# Patient Record
Sex: FEMALE | URBAN - METROPOLITAN AREA
[De-identification: names, ages, dates, MRNs, and addresses within clinical notes are randomized per-mention and may not be internally consistent; named-entity substitution may affect disease eponyms.]

---

## 2019-02-20 ENCOUNTER — OFFICE VISIT (OUTPATIENT)
Dept: URBAN - METROPOLITAN AREA CLINIC 76 | Facility: CLINIC | Age: 64
End: 2019-02-20
Payer: COMMERCIAL

## 2019-02-20 DIAGNOSIS — H04.123 DRY EYE SYNDROME OF BILATERAL LACRIMAL GLANDS: ICD-10-CM

## 2019-02-20 DIAGNOSIS — H40.011 OPEN ANGLE WITH BORDERLINE FINDINGS, LOW RISK, RIGHT EYE: ICD-10-CM

## 2019-02-20 PROCEDURE — 92015 DETERMINE REFRACTIVE STATE: CPT | Performed by: OPTOMETRIST

## 2019-02-20 PROCEDURE — 92014 COMPRE OPH EXAM EST PT 1/>: CPT | Performed by: OPTOMETRIST

## 2019-02-20 ASSESSMENT — KERATOMETRY
OD: 42.63
OS: 43.00

## 2019-02-20 ASSESSMENT — INTRAOCULAR PRESSURE
OD: 18
OS: 15

## 2019-02-20 ASSESSMENT — VISUAL ACUITY
OD: 20/25
OS: 20/40

## 2019-02-20 NOTE — IMPRESSION/PLAN
Impression: Open angle with borderline findings, low risk, right eye: H40.011. OD. Plan: Discussed diagnosis in detail with patient. Recommend Glaucoma testing VF and OCT.

## 2019-03-20 ENCOUNTER — OFFICE VISIT (OUTPATIENT)
Dept: URBAN - METROPOLITAN AREA CLINIC 76 | Facility: CLINIC | Age: 64
End: 2019-03-20
Payer: COMMERCIAL

## 2019-03-20 DIAGNOSIS — H52.13 MYOPIA, BILATERAL: ICD-10-CM

## 2019-03-20 PROCEDURE — 92133 CPTRZD OPH DX IMG PST SGM ON: CPT | Performed by: OPTOMETRIST

## 2019-03-20 PROCEDURE — 92083 EXTENDED VISUAL FIELD XM: CPT | Performed by: OPTOMETRIST

## 2019-03-20 PROCEDURE — 99213 OFFICE O/P EST LOW 20 MIN: CPT | Performed by: OPTOMETRIST

## 2019-03-20 ASSESSMENT — INTRAOCULAR PRESSURE
OS: 14
OD: 16

## 2019-03-20 NOTE — IMPRESSION/PLAN
Impression: Open angle with borderline findings, low risk, bilateral: H40.013. OU. preformed OCT and VF today. Large cupping OU. Plan: Discussed diagnosis with patient. No need for treatment at this time. Continue to monitor. Yearly glaucoma testing, DE, VF, and OCT.

## 2019-03-20 NOTE — IMPRESSION/PLAN
Impression: Myopia, bilateral: H52.13. OU. Plan: Doctor redo. Error while inputting information. Right eye rx changed.

## 2020-08-05 ENCOUNTER — OFFICE VISIT (OUTPATIENT)
Dept: URBAN - METROPOLITAN AREA CLINIC 76 | Facility: CLINIC | Age: 65
End: 2020-08-05
Payer: COMMERCIAL

## 2020-08-05 DIAGNOSIS — H40.013 OPEN ANGLE WITH BORDERLINE FINDINGS, LOW RISK, BILATERAL: ICD-10-CM

## 2020-08-05 PROCEDURE — 92014 COMPRE OPH EXAM EST PT 1/>: CPT | Performed by: OPTOMETRIST

## 2020-08-05 ASSESSMENT — INTRAOCULAR PRESSURE
OS: 15
OD: 17

## 2020-08-05 ASSESSMENT — VISUAL ACUITY
OS: 20/30
OD: 20/30

## 2020-08-05 ASSESSMENT — KERATOMETRY
OS: 43.13
OD: 42.75

## 2020-08-05 NOTE — IMPRESSION/PLAN
Impression: Open angle with borderline findings, low risk, bilateral: H40.013. Bilateral. large cupping, needs updated testing. Plan: Discussed with patient. Continue to monitor without treatment. Recommend updated testing.

## 2020-09-04 ENCOUNTER — OFFICE VISIT (OUTPATIENT)
Dept: URBAN - METROPOLITAN AREA CLINIC 76 | Facility: CLINIC | Age: 65
End: 2020-09-04
Payer: COMMERCIAL

## 2020-09-04 PROCEDURE — 92083 EXTENDED VISUAL FIELD XM: CPT | Performed by: OPTOMETRIST

## 2020-09-04 PROCEDURE — 99213 OFFICE O/P EST LOW 20 MIN: CPT | Performed by: OPTOMETRIST

## 2020-09-04 PROCEDURE — 92133 CPTRZD OPH DX IMG PST SGM ON: CPT | Performed by: OPTOMETRIST

## 2020-09-04 ASSESSMENT — INTRAOCULAR PRESSURE
OD: 17
OS: 18

## 2020-09-04 NOTE — IMPRESSION/PLAN
Impression: Open angle with borderline findings, low risk, bilateral: H40.013. Bilateral. IOP stable. Preformed and reviewed VF and RNFL OCT. VF OD normal, OS possible early superior nasal step. RNFL OCT OD normal OS mild superior NFL thinning. possibly due to sleep apnea? Plan: Discussed with diagnosis with patient. Discussed treatment vs monitoring. Patient would like to continue to monitor without treatment. Recommend repeating testing at next visit. Advised patient that treatment may be necessary depending on the testing results at the next visit.

## 2021-03-04 ENCOUNTER — OFFICE VISIT (OUTPATIENT)
Dept: URBAN - METROPOLITAN AREA CLINIC 76 | Facility: CLINIC | Age: 66
End: 2021-03-04
Payer: COMMERCIAL

## 2021-03-04 PROCEDURE — 99213 OFFICE O/P EST LOW 20 MIN: CPT | Performed by: OPTOMETRIST

## 2021-03-04 PROCEDURE — 92133 CPTRZD OPH DX IMG PST SGM ON: CPT | Performed by: OPTOMETRIST

## 2021-03-04 PROCEDURE — 92083 EXTENDED VISUAL FIELD XM: CPT | Performed by: OPTOMETRIST

## 2021-03-04 ASSESSMENT — INTRAOCULAR PRESSURE
OD: 16
OS: 16

## 2021-03-04 NOTE — IMPRESSION/PLAN
Impression: Open angle with borderline findings, low risk, bilateral: H40.013. Bilateral. IOP stable. Testing done today. IOP WNL/Stable OU. VF Normal OU. OCT Normal OD, Mild NFL thinning super>infer OS. Plan: Rediscussed with diagnosis with patient. RTC 6 month IOP check. Educated patient of the correlation between sleep apnea and glaucoma. Recommended sleep study with PCP to rule out sleep apnea.  (Update Testing 03/2022)

## 2021-09-03 ENCOUNTER — OFFICE VISIT (OUTPATIENT)
Dept: URBAN - METROPOLITAN AREA CLINIC 76 | Facility: CLINIC | Age: 66
End: 2021-09-03
Payer: COMMERCIAL

## 2021-09-03 DIAGNOSIS — H52.4 PRESBYOPIA: Primary | ICD-10-CM

## 2021-09-03 PROCEDURE — 92014 COMPRE OPH EXAM EST PT 1/>: CPT | Performed by: OPTOMETRIST

## 2021-09-03 ASSESSMENT — KERATOMETRY
OD: 42.88
OS: 43.00

## 2021-09-03 ASSESSMENT — INTRAOCULAR PRESSURE
OS: 18
OD: 18

## 2021-09-03 ASSESSMENT — VISUAL ACUITY
OD: 20/25
OS: 20/25

## 2021-09-03 NOTE — IMPRESSION/PLAN
Impression: Open angle with borderline findings, low risk, bilateral: H40.013. Bilateral. IOP stable today. Plan: Rediscussed with diagnosis with patient. Continue without treatment at this time. RTC 6 month DE updated VF/RNFL OCT.

## 2022-03-04 ENCOUNTER — OFFICE VISIT (OUTPATIENT)
Dept: URBAN - METROPOLITAN AREA CLINIC 76 | Facility: CLINIC | Age: 67
End: 2022-03-04
Payer: MEDICARE

## 2022-03-04 DIAGNOSIS — H25.13 AGE-RELATED NUCLEAR CATARACT, BILATERAL: ICD-10-CM

## 2022-03-04 PROCEDURE — 92133 CPTRZD OPH DX IMG PST SGM ON: CPT | Performed by: OPTOMETRIST

## 2022-03-04 PROCEDURE — 92083 EXTENDED VISUAL FIELD XM: CPT | Performed by: OPTOMETRIST

## 2022-03-04 PROCEDURE — 92014 COMPRE OPH EXAM EST PT 1/>: CPT | Performed by: OPTOMETRIST

## 2022-03-04 ASSESSMENT — INTRAOCULAR PRESSURE
OS: 19
OD: 20

## 2022-03-04 NOTE — IMPRESSION/PLAN
Impression: Open angle with borderline findings, low risk, bilateral: H40.013. 

-IOP high normal OU
-negative FM h/x
-VF 03/04/22 ordered and reviewed today; inconclusive nasal step, possible change OD, stable nonspecific defect OS. -OCT 03/04/22 ordered and reviewed today; stable OU. 
-average pachy OU
-no lung dz, heart dz, no sulfa allergy Plan: Rediscussed with diagnosis with patient. Continue without treatment at this time. RTC 6 month w/ repeat VF. Consider starting treatment pending repeat VF.

## 2022-07-22 ENCOUNTER — OFFICE VISIT (OUTPATIENT)
Dept: URBAN - METROPOLITAN AREA CLINIC 76 | Facility: CLINIC | Age: 67
End: 2022-07-22
Payer: MEDICARE

## 2022-07-22 DIAGNOSIS — H25.13 AGE-RELATED NUCLEAR CATARACT, BILATERAL: ICD-10-CM

## 2022-07-22 DIAGNOSIS — H40.013 OPEN ANGLE WITH BORDERLINE FINDINGS, LOW RISK, BILATERAL: Primary | ICD-10-CM

## 2022-07-22 PROCEDURE — 99212 OFFICE O/P EST SF 10 MIN: CPT | Performed by: OPTOMETRIST

## 2022-07-22 PROCEDURE — 92083 EXTENDED VISUAL FIELD XM: CPT | Performed by: OPTOMETRIST

## 2022-07-22 ASSESSMENT — INTRAOCULAR PRESSURE
OD: 20
OS: 20

## 2022-07-22 NOTE — IMPRESSION/PLAN
Impression: Age-related nuclear cataract, bilateral: H25.13 Bilateral. Plan: Observe. No treatment currently recommended as cataracts do not affect the patients day to day life. Patient to monitor for vision changes and if vision significantly worsens, advised to RTC for evaluation.

## 2022-07-22 NOTE — IMPRESSION/PLAN
Impression: Open angle with borderline findings, low risk, bilateral: H40.013. 
-IOP high normal OU
-negative FM h/x
-VF 07/22/2022 ordered and reviewed today; OD full and improvement from previous and OS nonspecific defect stable. -OCT 03/04/22 ordered and reviewed today; stable OU, OD normal and OS borderline thinning
-average pachy OU
-no lung dz, heart dz, no sulfa allergy Plan: Rediscussed with diagnosis with patient. IOP stable OU. Continue without treatment at this time. The pathophysiology of glaucoma and the difference between being a glaucoma suspect and having glaucoma were discussed with patient. Discussed and answered patients questions today. The risk of blindness if glaucoma goes undetected and or untreated was discussed with the patient.

## 2022-12-19 ENCOUNTER — OFFICE VISIT (OUTPATIENT)
Dept: URBAN - METROPOLITAN AREA CLINIC 76 | Facility: CLINIC | Age: 67
End: 2022-12-19
Payer: MEDICARE

## 2022-12-19 DIAGNOSIS — H25.13 AGE-RELATED NUCLEAR CATARACT, BILATERAL: ICD-10-CM

## 2022-12-19 DIAGNOSIS — H40.013 OPEN ANGLE WITH BORDERLINE FINDINGS, LOW RISK, BILATERAL: Primary | ICD-10-CM

## 2022-12-19 PROCEDURE — 99214 OFFICE O/P EST MOD 30 MIN: CPT | Performed by: OPTOMETRIST

## 2022-12-19 PROCEDURE — 92133 CPTRZD OPH DX IMG PST SGM ON: CPT | Performed by: OPTOMETRIST

## 2022-12-19 ASSESSMENT — INTRAOCULAR PRESSURE
OD: 19
OS: 19

## 2022-12-19 NOTE — IMPRESSION/PLAN
Impression: Open angle with borderline findings, low risk, bilateral: H40.013. 
-IOP stable OU
-negative FM h/x
-12/19/2022 order and performed OCT ON: OD normal and OS borderline thinning
-average pachy OU
-07/22/2022 VF 24-2; OD full and improvement from previous and OS nonspecific defect stable.
-average pachy OU
-no lung dz, heart dz, no sulfa allergy Plan: Discussed diagnosis with patient. Continue to monitor without drops at this time. The pathophysiology of glaucoma and the difference between being a glaucoma suspect and having glaucoma were discussed with patient. Continue to monitor.

## 2023-06-19 ENCOUNTER — OFFICE VISIT (OUTPATIENT)
Dept: URBAN - METROPOLITAN AREA CLINIC 76 | Facility: CLINIC | Age: 68
End: 2023-06-19
Payer: MEDICARE

## 2023-06-19 DIAGNOSIS — H25.13 AGE-RELATED NUCLEAR CATARACT, BILATERAL: ICD-10-CM

## 2023-06-19 DIAGNOSIS — H40.013 OPEN ANGLE WITH BORDERLINE FINDINGS, LOW RISK, BILATERAL: Primary | ICD-10-CM

## 2023-06-19 PROCEDURE — 99213 OFFICE O/P EST LOW 20 MIN: CPT | Performed by: OPTOMETRIST

## 2023-06-19 ASSESSMENT — INTRAOCULAR PRESSURE
OD: 17
OS: 16

## 2023-06-19 NOTE — IMPRESSION/PLAN
Impression: Open angle with borderline findings, low risk, bilateral: H40.013. 
-IOP stable OU
-negative FM h/x
-12/19/2022  OCT ON: OD normal and OS borderline thinning
-average pachy OU
-07/22/2022 VF 24-2; OD full and improvement from previous and OS nonspecific defect stable.
-average pachy OU
-no lung dz, heart dz, no sulfa allergy Plan: Discussed diagnosis with patient. Continue to monitor without drops at this time. The pathophysiology of glaucoma and the difference between being a glaucoma suspect and having glaucoma were discussed with patient. Continue to monitor. VF 24-2 at next visit, VF out of service today.

## 2023-08-09 ENCOUNTER — OFFICE VISIT (OUTPATIENT)
Dept: URBAN - METROPOLITAN AREA CLINIC 76 | Facility: CLINIC | Age: 68
End: 2023-08-09

## 2023-08-09 DIAGNOSIS — H52.13 MYOPIA, BILATERAL: Primary | ICD-10-CM

## 2023-08-09 ASSESSMENT — KERATOMETRY
OS: 43.13
OD: 42.75

## 2023-08-09 ASSESSMENT — VISUAL ACUITY
OS: 20/25
OD: 20/20

## 2023-12-20 ENCOUNTER — OFFICE VISIT (OUTPATIENT)
Dept: URBAN - METROPOLITAN AREA CLINIC 76 | Facility: CLINIC | Age: 68
End: 2023-12-20
Payer: MEDICARE

## 2023-12-20 PROCEDURE — 92133 CPTRZD OPH DX IMG PST SGM ON: CPT | Performed by: OPTOMETRIST

## 2023-12-20 PROCEDURE — 92083 EXTENDED VISUAL FIELD XM: CPT | Performed by: OPTOMETRIST

## 2023-12-20 ASSESSMENT — KERATOMETRY
OD: 42.88
OS: 43.50

## 2023-12-22 ENCOUNTER — OFFICE VISIT (OUTPATIENT)
Dept: URBAN - METROPOLITAN AREA CLINIC 76 | Facility: CLINIC | Age: 68
End: 2023-12-22
Payer: MEDICARE

## 2023-12-22 DIAGNOSIS — H40.013 OPEN ANGLE WITH BORDERLINE FINDINGS, LOW RISK, BILATERAL: Primary | ICD-10-CM

## 2023-12-22 DIAGNOSIS — H35.61 RETINAL HEMORRHAGE, RIGHT EYE: ICD-10-CM

## 2023-12-22 PROCEDURE — 92134 CPTRZ OPH DX IMG PST SGM RTA: CPT | Performed by: OPTOMETRIST

## 2023-12-22 PROCEDURE — 99214 OFFICE O/P EST MOD 30 MIN: CPT | Performed by: OPTOMETRIST

## 2023-12-22 ASSESSMENT — INTRAOCULAR PRESSURE
OS: 14
OD: 14

## 2023-12-29 ENCOUNTER — OFFICE VISIT (OUTPATIENT)
Dept: URBAN - METROPOLITAN AREA CLINIC 81 | Facility: CLINIC | Age: 68
End: 2023-12-29
Payer: MEDICARE

## 2023-12-29 DIAGNOSIS — H43.11: Primary | ICD-10-CM

## 2023-12-29 PROCEDURE — 92134 CPTRZ OPH DX IMG PST SGM RTA: CPT | Performed by: OPTOMETRIST

## 2023-12-29 PROCEDURE — 99213 OFFICE O/P EST LOW 20 MIN: CPT | Performed by: OPTOMETRIST

## 2024-01-08 ENCOUNTER — OFFICE VISIT (OUTPATIENT)
Dept: URBAN - METROPOLITAN AREA CLINIC 76 | Facility: CLINIC | Age: 69
End: 2024-01-08
Payer: MEDICARE

## 2024-01-08 PROCEDURE — 67145 PROPH RTA DTCHMNT PC: CPT | Performed by: OPHTHALMOLOGY

## 2024-01-08 PROCEDURE — 99204 OFFICE O/P NEW MOD 45 MIN: CPT | Performed by: OPHTHALMOLOGY

## 2024-01-08 ASSESSMENT — INTRAOCULAR PRESSURE
OS: 14
OD: 14

## 2024-02-09 ENCOUNTER — OFFICE VISIT (OUTPATIENT)
Dept: URBAN - METROPOLITAN AREA CLINIC 76 | Facility: CLINIC | Age: 69
End: 2024-02-09
Payer: MEDICARE

## 2024-02-09 DIAGNOSIS — H33.311 OPERCULUM OF RETINA W/O DETACHMENT OF RIGHT EYE: ICD-10-CM

## 2024-02-09 PROCEDURE — 92134 CPTRZ OPH DX IMG PST SGM RTA: CPT | Performed by: OPHTHALMOLOGY

## 2024-02-09 PROCEDURE — 99214 OFFICE O/P EST MOD 30 MIN: CPT | Performed by: OPHTHALMOLOGY

## 2024-02-09 ASSESSMENT — INTRAOCULAR PRESSURE
OS: 16
OD: 14

## 2024-03-21 ENCOUNTER — OFFICE VISIT (OUTPATIENT)
Dept: URBAN - METROPOLITAN AREA CLINIC 64 | Facility: LOCATION | Age: 69
End: 2024-03-21
Payer: MEDICARE

## 2024-03-21 DIAGNOSIS — H33.311 OPERCULUM OF RETINA W/O DETACHMENT OF RIGHT EYE: Primary | ICD-10-CM

## 2024-03-21 DIAGNOSIS — H43.11: ICD-10-CM

## 2024-03-21 PROCEDURE — 99214 OFFICE O/P EST MOD 30 MIN: CPT | Performed by: OPHTHALMOLOGY

## 2024-03-21 ASSESSMENT — INTRAOCULAR PRESSURE
OD: 14
OS: 15

## 2024-04-16 ENCOUNTER — POST-OPERATIVE VISIT (OUTPATIENT)
Dept: URBAN - METROPOLITAN AREA CLINIC 76 | Facility: CLINIC | Age: 69
End: 2024-04-16
Payer: MEDICARE

## 2024-04-16 DIAGNOSIS — Z48.810 ENCOUNTER FOR SURGICAL AFTERCARE FOLLOWING SURGERY ON A SENSE ORGAN: Primary | ICD-10-CM

## 2024-04-16 PROCEDURE — 99024 POSTOP FOLLOW-UP VISIT: CPT | Performed by: OPTOMETRIST

## 2024-04-16 ASSESSMENT — INTRAOCULAR PRESSURE
OD: 15
OS: 15

## 2024-04-25 ENCOUNTER — POST-OPERATIVE VISIT (OUTPATIENT)
Dept: URBAN - METROPOLITAN AREA CLINIC 76 | Facility: CLINIC | Age: 69
End: 2024-04-25
Payer: MEDICARE

## 2024-04-25 DIAGNOSIS — Z48.810 ENCOUNTER FOR SURGICAL AFTERCARE FOLLOWING SURGERY ON A SENSE ORGAN: Primary | ICD-10-CM

## 2024-04-25 PROCEDURE — 99024 POSTOP FOLLOW-UP VISIT: CPT | Performed by: OPTOMETRIST

## 2024-04-25 ASSESSMENT — INTRAOCULAR PRESSURE
OD: 15
OS: 16

## 2024-04-29 ENCOUNTER — POST-OPERATIVE VISIT (OUTPATIENT)
Dept: URBAN - METROPOLITAN AREA CLINIC 76 | Facility: CLINIC | Age: 69
End: 2024-04-29
Payer: MEDICARE

## 2024-04-29 DIAGNOSIS — H35.3132 NONEXUDATIVE MACULAR DEGENERATION, INTERMEDIATE DRY STAGE, BILATERAL: ICD-10-CM

## 2024-04-29 DIAGNOSIS — Z48.810 ENCOUNTER FOR SURGICAL AFTERCARE FOLLOWING SURGERY ON THE SENSE ORGANS: Primary | ICD-10-CM

## 2024-04-29 DIAGNOSIS — H33.311 OPERCULUM OF RETINA W/O DETACHMENT OF RIGHT EYE: ICD-10-CM

## 2024-04-29 PROCEDURE — 99024 POSTOP FOLLOW-UP VISIT: CPT | Performed by: OPHTHALMOLOGY

## 2024-04-29 PROCEDURE — 92134 CPTRZ OPH DX IMG PST SGM RTA: CPT | Performed by: OPHTHALMOLOGY

## 2024-04-29 ASSESSMENT — INTRAOCULAR PRESSURE
OS: 14
OD: 12

## 2024-06-10 ENCOUNTER — POST-OPERATIVE VISIT (OUTPATIENT)
Dept: URBAN - METROPOLITAN AREA CLINIC 76 | Facility: CLINIC | Age: 69
End: 2024-06-10
Payer: MEDICARE

## 2024-06-10 DIAGNOSIS — Z48.810 ENCOUNTER FOR SURGICAL AFTERCARE FOLLOWING SURGERY ON THE SENSE ORGANS: ICD-10-CM

## 2024-06-10 DIAGNOSIS — H25.13 AGE-RELATED NUCLEAR CATARACT, BILATERAL: ICD-10-CM

## 2024-06-10 DIAGNOSIS — H52.13 MYOPIA, BILATERAL: Primary | ICD-10-CM

## 2024-06-10 PROCEDURE — 99024 POSTOP FOLLOW-UP VISIT: CPT | Performed by: OPTOMETRIST

## 2024-06-10 ASSESSMENT — INTRAOCULAR PRESSURE
OS: 13
OD: 13

## 2024-06-10 ASSESSMENT — KERATOMETRY
OS: 43.38
OD: 42.75

## 2024-07-22 ENCOUNTER — OFFICE VISIT (OUTPATIENT)
Dept: URBAN - METROPOLITAN AREA CLINIC 76 | Facility: CLINIC | Age: 69
End: 2024-07-22
Payer: MEDICARE

## 2024-07-22 DIAGNOSIS — Z48.810 ENCOUNTER FOR SURGICAL AFTERCARE FOLLOWING SURGERY ON THE SENSE ORGANS: Primary | ICD-10-CM

## 2024-07-22 DIAGNOSIS — H35.371 PUCKERING OF MACULA, RIGHT EYE: ICD-10-CM

## 2024-07-22 DIAGNOSIS — H33.021 RETINAL DETACHMENT WITH MULTIPLE BREAKS, RIGHT EYE: ICD-10-CM

## 2024-07-22 PROCEDURE — 99214 OFFICE O/P EST MOD 30 MIN: CPT | Performed by: OPHTHALMOLOGY

## 2024-07-22 PROCEDURE — 92134 CPTRZ OPH DX IMG PST SGM RTA: CPT | Performed by: OPHTHALMOLOGY

## 2024-07-22 ASSESSMENT — INTRAOCULAR PRESSURE
OD: 12
OS: 14

## 2024-08-19 ENCOUNTER — SURGERY (OUTPATIENT)
Dept: URBAN - METROPOLITAN AREA SURGERY 47 | Facility: SURGERY | Age: 69
End: 2024-08-19
Payer: MEDICARE

## 2024-08-19 PROCEDURE — 67042 VIT FOR MACULAR HOLE: CPT | Performed by: OPHTHALMOLOGY

## 2024-08-20 ENCOUNTER — POST-OPERATIVE VISIT (OUTPATIENT)
Dept: URBAN - METROPOLITAN AREA CLINIC 76 | Facility: CLINIC | Age: 69
End: 2024-08-20
Payer: MEDICARE

## 2024-08-20 DIAGNOSIS — Z48.810 ENCOUNTER FOR SURGICAL AFTERCARE FOLLOWING SURGERY ON A SENSE ORGAN: Primary | ICD-10-CM

## 2024-08-20 PROCEDURE — 99024 POSTOP FOLLOW-UP VISIT: CPT

## 2024-08-20 ASSESSMENT — INTRAOCULAR PRESSURE
OS: 14
OD: 12

## 2024-09-16 ENCOUNTER — POST-OPERATIVE VISIT (OUTPATIENT)
Dept: URBAN - METROPOLITAN AREA CLINIC 76 | Facility: CLINIC | Age: 69
End: 2024-09-16
Payer: MEDICARE

## 2024-09-16 DIAGNOSIS — H59.031 CYSTOID MACULAR EDEMA FOLLOWING CATARACT SURGERY, RIGHT EYE: ICD-10-CM

## 2024-09-16 DIAGNOSIS — Z48.810 ENCOUNTER FOR SURGICAL AFTERCARE FOLLOWING SURGERY ON A SENSE ORGAN: ICD-10-CM

## 2024-09-16 DIAGNOSIS — H35.371 PUCKERING OF MACULA, RIGHT EYE: Primary | ICD-10-CM

## 2024-09-16 PROCEDURE — 92134 CPTRZ OPH DX IMG PST SGM RTA: CPT | Performed by: OPHTHALMOLOGY

## 2024-09-16 PROCEDURE — 99024 POSTOP FOLLOW-UP VISIT: CPT | Performed by: OPHTHALMOLOGY

## 2024-09-16 ASSESSMENT — INTRAOCULAR PRESSURE
OD: 14
OS: 16

## 2024-09-30 ENCOUNTER — OFFICE VISIT (OUTPATIENT)
Dept: URBAN - METROPOLITAN AREA CLINIC 76 | Facility: CLINIC | Age: 69
End: 2024-09-30
Payer: MEDICARE

## 2024-09-30 DIAGNOSIS — Z48.810 ENCOUNTER FOR SURGICAL AFTERCARE FOLLOWING SURGERY ON A SENSE ORGAN: ICD-10-CM

## 2024-09-30 DIAGNOSIS — H59.031 CYSTOID MACULAR EDEMA FOLLOWING CATARACT SURGERY, RIGHT EYE: Primary | ICD-10-CM

## 2024-09-30 PROCEDURE — 67516 SPRCHOROIDAL SPC NJX RX AGT: CPT | Performed by: OPHTHALMOLOGY

## 2024-09-30 ASSESSMENT — INTRAOCULAR PRESSURE
OD: 15
OS: 17

## 2024-11-11 ENCOUNTER — OFFICE VISIT (OUTPATIENT)
Dept: URBAN - METROPOLITAN AREA CLINIC 76 | Facility: CLINIC | Age: 69
End: 2024-11-11
Payer: MEDICARE

## 2024-11-11 DIAGNOSIS — H25.13 AGE-RELATED NUCLEAR CATARACT, BILATERAL: ICD-10-CM

## 2024-11-11 DIAGNOSIS — H33.021 RETINAL DETACHMENT WITH MULTIPLE BREAKS, RIGHT EYE: ICD-10-CM

## 2024-11-11 DIAGNOSIS — H59.031 CYSTOID MACULAR EDEMA FOLLOWING CATARACT SURGERY, RIGHT EYE: Primary | ICD-10-CM

## 2024-11-11 PROCEDURE — 99214 OFFICE O/P EST MOD 30 MIN: CPT | Performed by: OPHTHALMOLOGY

## 2024-11-11 PROCEDURE — 92134 CPTRZ OPH DX IMG PST SGM RTA: CPT | Performed by: OPHTHALMOLOGY

## 2024-11-11 ASSESSMENT — INTRAOCULAR PRESSURE
OS: 17
OD: 17

## 2024-12-03 ENCOUNTER — OFFICE VISIT (OUTPATIENT)
Dept: URBAN - METROPOLITAN AREA CLINIC 76 | Facility: CLINIC | Age: 69
End: 2024-12-03
Payer: MEDICARE

## 2024-12-03 DIAGNOSIS — H52.4 PRESBYOPIA: ICD-10-CM

## 2024-12-03 DIAGNOSIS — H25.13 AGE-RELATED NUCLEAR CATARACT, BILATERAL: Primary | ICD-10-CM

## 2024-12-03 DIAGNOSIS — H33.021 RETINAL DETACHMENT WITH MULTIPLE BREAKS, RIGHT EYE: ICD-10-CM

## 2024-12-03 DIAGNOSIS — H52.223 REGULAR ASTIGMATISM, BILATERAL: ICD-10-CM

## 2024-12-03 PROCEDURE — 92015 DETERMINE REFRACTIVE STATE: CPT | Performed by: STUDENT IN AN ORGANIZED HEALTH CARE EDUCATION/TRAINING PROGRAM

## 2024-12-03 PROCEDURE — 99214 OFFICE O/P EST MOD 30 MIN: CPT | Performed by: STUDENT IN AN ORGANIZED HEALTH CARE EDUCATION/TRAINING PROGRAM

## 2024-12-03 PROCEDURE — 92134 CPTRZ OPH DX IMG PST SGM RTA: CPT | Performed by: STUDENT IN AN ORGANIZED HEALTH CARE EDUCATION/TRAINING PROGRAM

## 2024-12-03 PROCEDURE — 92025 CPTRIZED CORNEAL TOPOGRAPHY: CPT | Performed by: STUDENT IN AN ORGANIZED HEALTH CARE EDUCATION/TRAINING PROGRAM

## 2024-12-03 ASSESSMENT — VISUAL ACUITY
OS: 20/25
OD: 20/60

## 2024-12-03 ASSESSMENT — INTRAOCULAR PRESSURE
OD: 15
OS: 15

## 2025-01-08 ENCOUNTER — TECH ONLY (OUTPATIENT)
Dept: URBAN - METROPOLITAN AREA CLINIC 76 | Facility: CLINIC | Age: 70
End: 2025-01-08
Payer: MEDICARE

## 2025-01-08 DIAGNOSIS — H25.13 AGE-RELATED NUCLEAR CATARACT, BILATERAL: Primary | ICD-10-CM

## 2025-01-08 RX ORDER — KETOROLAC TROMETHAMINE 5 MG/ML
0.5 % SOLUTION OPHTHALMIC
Qty: 5 | Refills: 1 | Status: ACTIVE
Start: 2025-01-08

## 2025-01-08 ASSESSMENT — PACHYMETRY
OD: 3.69
OS: 3.01
OD: 24.38
OS: 25.32

## 2025-03-03 ENCOUNTER — OFFICE VISIT (OUTPATIENT)
Dept: URBAN - METROPOLITAN AREA CLINIC 76 | Facility: CLINIC | Age: 70
End: 2025-03-03
Payer: MEDICARE

## 2025-03-03 DIAGNOSIS — H40.022 OPEN ANGLE WITH BORDERLINE FINDINGS, HIGH RISK, LEFT EYE: ICD-10-CM

## 2025-03-03 DIAGNOSIS — Z48.810 ENCOUNTER FOR SURGICAL AFTERCARE FOLLOWING SURGERY ON A SENSE ORGAN: ICD-10-CM

## 2025-03-03 DIAGNOSIS — H33.021 RETINAL DETACHMENT WITH MULTIPLE BREAKS, RIGHT EYE: ICD-10-CM

## 2025-03-03 DIAGNOSIS — H25.13 AGE-RELATED NUCLEAR CATARACT, BILATERAL: ICD-10-CM

## 2025-03-03 ASSESSMENT — INTRAOCULAR PRESSURE
OD: 14
OS: 16

## 2025-03-05 ENCOUNTER — OFFICE VISIT (OUTPATIENT)
Dept: URBAN - METROPOLITAN AREA CLINIC 76 | Facility: CLINIC | Age: 70
End: 2025-03-05
Payer: MEDICARE

## 2025-03-05 DIAGNOSIS — H59.031 CYSTOID MACULAR EDEMA FOLLOWING CATARACT SURGERY, RIGHT EYE: ICD-10-CM

## 2025-03-05 DIAGNOSIS — H40.013 OPEN ANGLE WITH BORDERLINE FINDINGS, LOW RISK, BILATERAL: Primary | ICD-10-CM

## 2025-03-05 RX ORDER — TIMOLOL MALEATE 5 MG/ML
0.5 % SOLUTION/ DROPS OPHTHALMIC
Qty: 15 | Refills: 3 | Status: ACTIVE
Start: 2025-03-05

## 2025-03-05 ASSESSMENT — KERATOMETRY
OS: 43.25
OD: 42.50

## 2025-03-05 ASSESSMENT — INTRAOCULAR PRESSURE
OD: 14
OS: 16

## 2025-03-17 ENCOUNTER — OFFICE VISIT (OUTPATIENT)
Dept: URBAN - METROPOLITAN AREA CLINIC 76 | Facility: CLINIC | Age: 70
End: 2025-03-17
Payer: MEDICARE

## 2025-03-17 DIAGNOSIS — Z48.810 ENCOUNTER FOR SURGICAL AFTERCARE FOLLOWING SURGERY ON A SENSE ORGAN: ICD-10-CM

## 2025-03-17 DIAGNOSIS — H59.031 CYSTOID MACULAR EDEMA FOLLOWING CATARACT SURGERY, RIGHT EYE: Primary | ICD-10-CM

## 2025-03-17 PROCEDURE — 67516 SPRCHOROIDAL SPC NJX RX AGT: CPT | Performed by: OPHTHALMOLOGY

## 2025-03-17 ASSESSMENT — INTRAOCULAR PRESSURE
OS: 14
OD: 12

## 2025-05-06 ENCOUNTER — OFFICE VISIT (OUTPATIENT)
Dept: URBAN - METROPOLITAN AREA CLINIC 76 | Facility: CLINIC | Age: 70
End: 2025-05-06
Payer: MEDICARE

## 2025-05-06 DIAGNOSIS — H52.223 REGULAR ASTIGMATISM, BILATERAL: ICD-10-CM

## 2025-05-06 DIAGNOSIS — H52.4 PRESBYOPIA: ICD-10-CM

## 2025-05-06 DIAGNOSIS — H25.13 AGE-RELATED NUCLEAR CATARACT, BILATERAL: Primary | ICD-10-CM

## 2025-05-06 DIAGNOSIS — H33.021 RETINAL DETACHMENT WITH MULTIPLE BREAKS, RIGHT EYE: ICD-10-CM

## 2025-05-06 PROCEDURE — 92025 CPTRIZED CORNEAL TOPOGRAPHY: CPT | Performed by: STUDENT IN AN ORGANIZED HEALTH CARE EDUCATION/TRAINING PROGRAM

## 2025-05-06 PROCEDURE — 92134 CPTRZ OPH DX IMG PST SGM RTA: CPT | Performed by: STUDENT IN AN ORGANIZED HEALTH CARE EDUCATION/TRAINING PROGRAM

## 2025-05-06 PROCEDURE — 92015 DETERMINE REFRACTIVE STATE: CPT | Performed by: STUDENT IN AN ORGANIZED HEALTH CARE EDUCATION/TRAINING PROGRAM

## 2025-05-06 PROCEDURE — 99214 OFFICE O/P EST MOD 30 MIN: CPT | Performed by: STUDENT IN AN ORGANIZED HEALTH CARE EDUCATION/TRAINING PROGRAM

## 2025-05-06 ASSESSMENT — VISUAL ACUITY
OS: 20/20
OD: 20/60

## 2025-05-06 ASSESSMENT — INTRAOCULAR PRESSURE
OS: 14
OD: 10

## 2025-05-12 ENCOUNTER — OFFICE VISIT (OUTPATIENT)
Dept: URBAN - METROPOLITAN AREA CLINIC 76 | Facility: CLINIC | Age: 70
End: 2025-05-12
Payer: MEDICARE

## 2025-05-12 DIAGNOSIS — Z48.810 ENCOUNTER FOR SURGICAL AFTERCARE FOLLOWING SURGERY ON A SENSE ORGAN: ICD-10-CM

## 2025-05-12 DIAGNOSIS — H59.031 CYSTOID MACULAR EDEMA FOLLOWING CATARACT SURGERY, RIGHT EYE: Primary | ICD-10-CM

## 2025-05-12 DIAGNOSIS — H33.021 RETINAL DETACHMENT WITH MULTIPLE BREAKS, RIGHT EYE: ICD-10-CM

## 2025-05-12 PROCEDURE — 99214 OFFICE O/P EST MOD 30 MIN: CPT | Performed by: OPHTHALMOLOGY

## 2025-05-12 PROCEDURE — 92134 CPTRZ OPH DX IMG PST SGM RTA: CPT | Performed by: OPHTHALMOLOGY

## 2025-05-12 ASSESSMENT — INTRAOCULAR PRESSURE
OS: 16
OD: 13

## 2025-05-19 ENCOUNTER — OFFICE VISIT (OUTPATIENT)
Dept: URBAN - METROPOLITAN AREA CLINIC 76 | Facility: CLINIC | Age: 70
End: 2025-05-19
Payer: MEDICARE

## 2025-05-19 ENCOUNTER — SURGERY (OUTPATIENT)
Dept: URBAN - METROPOLITAN AREA SURGERY 47 | Facility: SURGERY | Age: 70
End: 2025-05-19
Payer: MEDICARE

## 2025-05-19 DIAGNOSIS — H25.13 AGE-RELATED NUCLEAR CATARACT, BILATERAL: Primary | ICD-10-CM

## 2025-05-19 PROCEDURE — 66984 XCAPSL CTRC RMVL W/O ECP: CPT | Performed by: STUDENT IN AN ORGANIZED HEALTH CARE EDUCATION/TRAINING PROGRAM

## 2025-05-19 PROCEDURE — 99212 OFFICE O/P EST SF 10 MIN: CPT | Performed by: STUDENT IN AN ORGANIZED HEALTH CARE EDUCATION/TRAINING PROGRAM

## 2025-05-19 ASSESSMENT — INTRAOCULAR PRESSURE
OD: 14
OD: 14
OS: 15
OS: 15

## 2025-05-20 ENCOUNTER — POST-OPERATIVE VISIT (OUTPATIENT)
Dept: URBAN - METROPOLITAN AREA CLINIC 76 | Facility: CLINIC | Age: 70
End: 2025-05-20
Payer: MEDICARE

## 2025-05-20 DIAGNOSIS — Z48.810 ENCOUNTER FOR SURGICAL AFTERCARE FOLLOWING SURGERY ON A SENSE ORGAN: Primary | ICD-10-CM

## 2025-05-20 PROCEDURE — 99024 POSTOP FOLLOW-UP VISIT: CPT | Performed by: OPTOMETRIST

## 2025-05-20 ASSESSMENT — INTRAOCULAR PRESSURE
OD: 16
OS: 16

## 2025-05-27 ENCOUNTER — POST-OPERATIVE VISIT (OUTPATIENT)
Dept: URBAN - METROPOLITAN AREA CLINIC 76 | Facility: CLINIC | Age: 70
End: 2025-05-27
Payer: MEDICARE

## 2025-05-27 DIAGNOSIS — Z48.810 ENCOUNTER FOR SURGICAL AFTERCARE FOLLOWING SURGERY ON A SENSE ORGAN: ICD-10-CM

## 2025-05-27 DIAGNOSIS — H52.4 PRESBYOPIA: Primary | ICD-10-CM

## 2025-05-27 PROCEDURE — 92015 DETERMINE REFRACTIVE STATE: CPT | Performed by: OPTOMETRIST

## 2025-05-27 PROCEDURE — 99024 POSTOP FOLLOW-UP VISIT: CPT | Performed by: OPTOMETRIST

## 2025-05-27 ASSESSMENT — INTRAOCULAR PRESSURE
OS: 15
OD: 15

## 2025-05-27 ASSESSMENT — VISUAL ACUITY
OS: 20/40
OD: 20/400

## 2025-05-30 ENCOUNTER — OFFICE VISIT (OUTPATIENT)
Dept: URBAN - METROPOLITAN AREA CLINIC 76 | Facility: CLINIC | Age: 70
End: 2025-05-30
Payer: MEDICARE

## 2025-05-30 DIAGNOSIS — H33.021 RETINAL DETACHMENT WITH MULTIPLE BREAKS, RIGHT EYE: ICD-10-CM

## 2025-05-30 DIAGNOSIS — H30.001 UNSPECIFIED FOCAL CHORIORETINAL INFLAMMATION, RIGHT EYE: Primary | ICD-10-CM

## 2025-05-30 PROCEDURE — 92134 CPTRZ OPH DX IMG PST SGM RTA: CPT | Performed by: OPHTHALMOLOGY

## 2025-05-30 PROCEDURE — 67028 INJECTION EYE DRUG: CPT | Performed by: OPHTHALMOLOGY

## 2025-05-30 ASSESSMENT — INTRAOCULAR PRESSURE
OS: 13
OD: 13

## 2025-06-02 ENCOUNTER — SURGERY (OUTPATIENT)
Dept: URBAN - METROPOLITAN AREA SURGERY 47 | Facility: SURGERY | Age: 70
End: 2025-06-02
Payer: MEDICARE

## 2025-06-02 PROCEDURE — 66984 XCAPSL CTRC RMVL W/O ECP: CPT | Performed by: STUDENT IN AN ORGANIZED HEALTH CARE EDUCATION/TRAINING PROGRAM

## 2025-06-03 ENCOUNTER — POST-OPERATIVE VISIT (OUTPATIENT)
Dept: URBAN - METROPOLITAN AREA CLINIC 76 | Facility: CLINIC | Age: 70
End: 2025-06-03
Payer: MEDICARE

## 2025-06-03 DIAGNOSIS — Z96.1 PRESENCE OF INTRAOCULAR LENS: Primary | ICD-10-CM

## 2025-06-03 PROCEDURE — 99024 POSTOP FOLLOW-UP VISIT: CPT | Performed by: OPTOMETRIST

## 2025-06-03 ASSESSMENT — INTRAOCULAR PRESSURE
OD: 12
OS: 11

## 2025-06-10 ENCOUNTER — POST-OPERATIVE VISIT (OUTPATIENT)
Dept: URBAN - METROPOLITAN AREA CLINIC 76 | Facility: CLINIC | Age: 70
End: 2025-06-10
Payer: MEDICARE

## 2025-06-10 DIAGNOSIS — H52.4 PRESBYOPIA: Primary | ICD-10-CM

## 2025-06-10 DIAGNOSIS — Z96.1 PRESENCE OF INTRAOCULAR LENS: ICD-10-CM

## 2025-06-10 PROCEDURE — 92015 DETERMINE REFRACTIVE STATE: CPT | Performed by: OPTOMETRIST

## 2025-06-10 PROCEDURE — 99024 POSTOP FOLLOW-UP VISIT: CPT | Performed by: OPTOMETRIST

## 2025-06-10 ASSESSMENT — VISUAL ACUITY
OS: 20/40
OD: 20/60

## 2025-06-10 ASSESSMENT — INTRAOCULAR PRESSURE
OD: 12
OS: 10

## 2025-06-18 ENCOUNTER — OFFICE VISIT (OUTPATIENT)
Dept: URBAN - METROPOLITAN AREA CLINIC 76 | Facility: CLINIC | Age: 70
End: 2025-06-18
Payer: MEDICARE

## 2025-06-18 DIAGNOSIS — H40.013 OPEN ANGLE WITH BORDERLINE FINDINGS, LOW RISK, BILATERAL: Primary | ICD-10-CM

## 2025-06-18 PROCEDURE — 99213 OFFICE O/P EST LOW 20 MIN: CPT | Performed by: OPTOMETRIST

## 2025-06-18 ASSESSMENT — INTRAOCULAR PRESSURE
OD: 15
OS: 12

## 2025-07-02 ENCOUNTER — POST-OPERATIVE VISIT (OUTPATIENT)
Dept: URBAN - METROPOLITAN AREA CLINIC 76 | Facility: CLINIC | Age: 70
End: 2025-07-02
Payer: MEDICARE

## 2025-07-02 DIAGNOSIS — H52.4 PRESBYOPIA: ICD-10-CM

## 2025-07-02 DIAGNOSIS — Z96.1 PRESENCE OF INTRAOCULAR LENS: Primary | ICD-10-CM

## 2025-07-02 PROCEDURE — 92015 DETERMINE REFRACTIVE STATE: CPT | Performed by: OPTOMETRIST

## 2025-07-02 PROCEDURE — 99024 POSTOP FOLLOW-UP VISIT: CPT | Performed by: OPTOMETRIST

## 2025-07-02 ASSESSMENT — INTRAOCULAR PRESSURE
OD: 16
OS: 15

## 2025-07-02 ASSESSMENT — VISUAL ACUITY
OS: 20/20
OD: 20/100+

## 2025-07-25 ENCOUNTER — OFFICE VISIT (OUTPATIENT)
Dept: URBAN - METROPOLITAN AREA CLINIC 76 | Facility: CLINIC | Age: 70
End: 2025-07-25
Payer: MEDICARE

## 2025-07-25 DIAGNOSIS — H33.021 RETINAL DETACHMENT WITH MULTIPLE BREAKS, RIGHT EYE: ICD-10-CM

## 2025-07-25 DIAGNOSIS — Z48.810 ENCOUNTER FOR SURGICAL AFTERCARE FOLLOWING SURGERY ON A SENSE ORGAN: ICD-10-CM

## 2025-07-25 DIAGNOSIS — H30.001 UNSPECIFIED FOCAL CHORIORETINAL INFLAMMATION, RIGHT EYE: Primary | ICD-10-CM

## 2025-07-25 PROCEDURE — 92134 CPTRZ OPH DX IMG PST SGM RTA: CPT | Performed by: OPHTHALMOLOGY

## 2025-07-25 PROCEDURE — 99214 OFFICE O/P EST MOD 30 MIN: CPT | Performed by: OPHTHALMOLOGY

## 2025-07-25 ASSESSMENT — INTRAOCULAR PRESSURE
OD: 11
OS: 10